# Patient Record
Sex: MALE | Race: OTHER | HISPANIC OR LATINO | Employment: FULL TIME | URBAN - METROPOLITAN AREA
[De-identification: names, ages, dates, MRNs, and addresses within clinical notes are randomized per-mention and may not be internally consistent; named-entity substitution may affect disease eponyms.]

---

## 2023-06-28 LAB
ALBUMIN SERPL BCP-MCNC: 6 G/DL (ref 3.5–5.2)
ALP SERPL-CCNC: 104 U/L (ref 55–135)
ALT SERPL W/O P-5'-P-CCNC: 26 U/L (ref 10–44)
ANION GAP SERPL CALC-SCNC: 26 MMOL/L (ref 8–16)
AST SERPL-CCNC: 30 U/L (ref 10–40)
BASOPHILS # BLD AUTO: 0.02 K/UL (ref 0–0.2)
BASOPHILS NFR BLD: 0.1 % (ref 0–1.9)
BILIRUB SERPL-MCNC: 0.7 MG/DL (ref 0.1–1)
BUN SERPL-MCNC: 67 MG/DL (ref 6–20)
CALCIUM SERPL-MCNC: 11 MG/DL (ref 8.7–10.5)
CHLORIDE SERPL-SCNC: 84 MMOL/L (ref 95–110)
CK SERPL-CCNC: 641 U/L (ref 20–200)
CO2 SERPL-SCNC: 26 MMOL/L (ref 23–29)
CREAT SERPL-MCNC: 5 MG/DL (ref 0.5–1.4)
DIFFERENTIAL METHOD: ABNORMAL
EOSINOPHIL # BLD AUTO: 0 K/UL (ref 0–0.5)
EOSINOPHIL NFR BLD: 0 % (ref 0–8)
ERYTHROCYTE [DISTWIDTH] IN BLOOD BY AUTOMATED COUNT: 12.1 % (ref 11.5–14.5)
EST. GFR  (NO RACE VARIABLE): 15 ML/MIN/1.73 M^2
GLUCOSE SERPL-MCNC: 140 MG/DL (ref 70–110)
HCT VFR BLD AUTO: 49.1 % (ref 40–54)
HGB BLD-MCNC: 17.3 G/DL (ref 14–18)
IMM GRANULOCYTES # BLD AUTO: 0.05 K/UL (ref 0–0.04)
IMM GRANULOCYTES NFR BLD AUTO: 0.4 % (ref 0–0.5)
LYMPHOCYTES # BLD AUTO: 1.4 K/UL (ref 1–4.8)
LYMPHOCYTES NFR BLD: 10.4 % (ref 18–48)
MCH RBC QN AUTO: 29.5 PG (ref 27–31)
MCHC RBC AUTO-ENTMCNC: 35.2 G/DL (ref 32–36)
MCV RBC AUTO: 84 FL (ref 82–98)
MONOCYTES # BLD AUTO: 1.1 K/UL (ref 0.3–1)
MONOCYTES NFR BLD: 8 % (ref 4–15)
NEUTROPHILS # BLD AUTO: 10.9 K/UL (ref 1.8–7.7)
NEUTROPHILS NFR BLD: 81.1 % (ref 38–73)
NRBC BLD-RTO: 0 /100 WBC
PLATELET # BLD AUTO: 346 K/UL (ref 150–450)
PMV BLD AUTO: 10.2 FL (ref 9.2–12.9)
POTASSIUM SERPL-SCNC: 5.3 MMOL/L (ref 3.5–5.1)
PROT SERPL-MCNC: 10.5 G/DL (ref 6–8.4)
RBC # BLD AUTO: 5.87 M/UL (ref 4.6–6.2)
SODIUM SERPL-SCNC: 136 MMOL/L (ref 136–145)
WBC # BLD AUTO: 13.44 K/UL (ref 3.9–12.7)

## 2023-06-28 PROCEDURE — 96360 HYDRATION IV INFUSION INIT: CPT

## 2023-06-28 PROCEDURE — 93005 ELECTROCARDIOGRAM TRACING: CPT

## 2023-06-28 PROCEDURE — 93010 EKG 12-LEAD: ICD-10-PCS | Mod: ,,, | Performed by: INTERNAL MEDICINE

## 2023-06-28 PROCEDURE — 82550 ASSAY OF CK (CPK): CPT | Performed by: NURSE PRACTITIONER

## 2023-06-28 PROCEDURE — 85025 COMPLETE CBC W/AUTO DIFF WBC: CPT | Performed by: NURSE PRACTITIONER

## 2023-06-28 PROCEDURE — 80053 COMPREHEN METABOLIC PANEL: CPT | Performed by: NURSE PRACTITIONER

## 2023-06-28 PROCEDURE — 99284 EMERGENCY DEPT VISIT MOD MDM: CPT | Mod: 25

## 2023-06-28 PROCEDURE — 93010 ELECTROCARDIOGRAM REPORT: CPT | Mod: ,,, | Performed by: INTERNAL MEDICINE

## 2023-06-28 PROCEDURE — 25000003 PHARM REV CODE 250: Performed by: NURSE PRACTITIONER

## 2023-06-28 RX ORDER — IBUPROFEN 800 MG/1
800 TABLET ORAL
Status: DISCONTINUED | OUTPATIENT
Start: 2023-06-28 | End: 2023-06-28

## 2023-06-28 RX ORDER — ACETAMINOPHEN 500 MG
1000 TABLET ORAL
Status: COMPLETED | OUTPATIENT
Start: 2023-06-28 | End: 2023-06-29

## 2023-06-28 RX ADMIN — SODIUM CHLORIDE 1000 ML: 9 INJECTION, SOLUTION INTRAVENOUS at 10:06

## 2023-06-29 ENCOUNTER — HOSPITAL ENCOUNTER (OUTPATIENT)
Facility: HOSPITAL | Age: 33
Discharge: HOME | End: 2023-06-29
Attending: EMERGENCY MEDICINE | Admitting: INTERNAL MEDICINE

## 2023-06-29 VITALS
TEMPERATURE: 99 F | OXYGEN SATURATION: 99 % | DIASTOLIC BLOOD PRESSURE: 70 MMHG | HEART RATE: 60 BPM | SYSTOLIC BLOOD PRESSURE: 100 MMHG | WEIGHT: 112.88 LBS | RESPIRATION RATE: 20 BRPM

## 2023-06-29 DIAGNOSIS — T67.5XXA HEAT EXHAUSTION, INITIAL ENCOUNTER: ICD-10-CM

## 2023-06-29 DIAGNOSIS — N17.9 ACUTE RENAL FAILURE, UNSPECIFIED ACUTE RENAL FAILURE TYPE: Primary | ICD-10-CM

## 2023-06-29 DIAGNOSIS — E86.0 DEHYDRATION: ICD-10-CM

## 2023-06-29 DIAGNOSIS — N17.9 AKI (ACUTE KIDNEY INJURY): ICD-10-CM

## 2023-06-29 DIAGNOSIS — R55 SYNCOPE: ICD-10-CM

## 2023-06-29 PROBLEM — E83.52 HYPERCALCEMIA: Status: ACTIVE | Noted: 2023-06-29

## 2023-06-29 LAB
ALBUMIN SERPL BCP-MCNC: 4.1 G/DL (ref 3.5–5.2)
ANION GAP SERPL CALC-SCNC: 10 MMOL/L (ref 8–16)
BACTERIA #/AREA URNS HPF: ABNORMAL /HPF
BILIRUB UR QL STRIP: NEGATIVE
BUN SERPL-MCNC: 38 MG/DL (ref 6–20)
CALCIUM SERPL-MCNC: 8.9 MG/DL (ref 8.7–10.5)
CAOX CRY URNS QL MICRO: ABNORMAL
CHLORIDE SERPL-SCNC: 102 MMOL/L (ref 95–110)
CLARITY UR: ABNORMAL
CO2 SERPL-SCNC: 25 MMOL/L (ref 23–29)
COLOR UR: YELLOW
CREAT SERPL-MCNC: 1.2 MG/DL (ref 0.5–1.4)
EST. GFR  (NO RACE VARIABLE): >60 ML/MIN/1.73 M^2
GLUCOSE SERPL-MCNC: 103 MG/DL (ref 70–110)
GLUCOSE UR QL STRIP: NEGATIVE
HGB UR QL STRIP: ABNORMAL
HYALINE CASTS #/AREA URNS LPF: 76 /LPF
KETONES UR QL STRIP: NEGATIVE
LEUKOCYTE ESTERASE UR QL STRIP: NEGATIVE
MICROSCOPIC COMMENT: ABNORMAL
NITRITE UR QL STRIP: NEGATIVE
PH UR STRIP: 6 [PH] (ref 5–8)
PHOSPHATE SERPL-MCNC: 3.5 MG/DL (ref 2.7–4.5)
POTASSIUM SERPL-SCNC: 3.9 MMOL/L (ref 3.5–5.1)
PROT UR QL STRIP: ABNORMAL
RBC #/AREA URNS HPF: 0 /HPF (ref 0–4)
SODIUM SERPL-SCNC: 137 MMOL/L (ref 136–145)
SP GR UR STRIP: 1.02 (ref 1–1.03)
URN SPEC COLLECT METH UR: ABNORMAL
UROBILINOGEN UR STRIP-ACNC: NEGATIVE EU/DL
WBC #/AREA URNS HPF: 3 /HPF (ref 0–5)

## 2023-06-29 PROCEDURE — 25000003 PHARM REV CODE 250: Performed by: INTERNAL MEDICINE

## 2023-06-29 PROCEDURE — 93005 ELECTROCARDIOGRAM TRACING: CPT

## 2023-06-29 PROCEDURE — 25000003 PHARM REV CODE 250: Performed by: EMERGENCY MEDICINE

## 2023-06-29 PROCEDURE — 96361 HYDRATE IV INFUSION ADD-ON: CPT

## 2023-06-29 PROCEDURE — 63600175 PHARM REV CODE 636 W HCPCS: Performed by: EMERGENCY MEDICINE

## 2023-06-29 PROCEDURE — G0378 HOSPITAL OBSERVATION PER HR: HCPCS

## 2023-06-29 PROCEDURE — 81000 URINALYSIS NONAUTO W/SCOPE: CPT | Performed by: EMERGENCY MEDICINE

## 2023-06-29 PROCEDURE — 93010 ELECTROCARDIOGRAM REPORT: CPT | Mod: ,,, | Performed by: INTERNAL MEDICINE

## 2023-06-29 PROCEDURE — 93010 EKG 12-LEAD: ICD-10-PCS | Mod: ,,, | Performed by: INTERNAL MEDICINE

## 2023-06-29 PROCEDURE — 25000003 PHARM REV CODE 250: Performed by: NURSE PRACTITIONER

## 2023-06-29 PROCEDURE — 80069 RENAL FUNCTION PANEL: CPT | Performed by: INTERNAL MEDICINE

## 2023-06-29 PROCEDURE — 36415 COLL VENOUS BLD VENIPUNCTURE: CPT | Performed by: INTERNAL MEDICINE

## 2023-06-29 RX ORDER — SODIUM CHLORIDE 9 MG/ML
INJECTION, SOLUTION INTRAVENOUS CONTINUOUS
Status: DISCONTINUED | OUTPATIENT
Start: 2023-06-29 | End: 2023-06-29 | Stop reason: HOSPADM

## 2023-06-29 RX ORDER — ACETAMINOPHEN 325 MG/1
650 TABLET ORAL EVERY 6 HOURS PRN
Status: DISCONTINUED | OUTPATIENT
Start: 2023-06-29 | End: 2023-06-29 | Stop reason: HOSPADM

## 2023-06-29 RX ORDER — GUAIFENESIN 100 MG/5ML
200 SOLUTION ORAL EVERY 4 HOURS PRN
Status: DISCONTINUED | OUTPATIENT
Start: 2023-06-29 | End: 2023-06-29 | Stop reason: HOSPADM

## 2023-06-29 RX ORDER — IPRATROPIUM BROMIDE AND ALBUTEROL SULFATE 2.5; .5 MG/3ML; MG/3ML
3 SOLUTION RESPIRATORY (INHALATION) EVERY 4 HOURS PRN
Status: DISCONTINUED | OUTPATIENT
Start: 2023-06-29 | End: 2023-06-29 | Stop reason: HOSPADM

## 2023-06-29 RX ORDER — ONDANSETRON 2 MG/ML
4 INJECTION INTRAMUSCULAR; INTRAVENOUS EVERY 8 HOURS PRN
Status: DISCONTINUED | OUTPATIENT
Start: 2023-06-29 | End: 2023-06-29 | Stop reason: HOSPADM

## 2023-06-29 RX ADMIN — SODIUM CHLORIDE: 9 INJECTION, SOLUTION INTRAVENOUS at 06:06

## 2023-06-29 RX ADMIN — SODIUM CHLORIDE, POTASSIUM CHLORIDE, SODIUM LACTATE AND CALCIUM CHLORIDE 1000 ML: 600; 310; 30; 20 INJECTION, SOLUTION INTRAVENOUS at 02:06

## 2023-06-29 RX ADMIN — ACETAMINOPHEN 1000 MG: 500 TABLET ORAL at 02:06

## 2023-06-29 RX ADMIN — SODIUM CHLORIDE 1000 ML: 9 INJECTION, SOLUTION INTRAVENOUS at 02:06

## 2023-06-29 NOTE — DISCHARGE SUMMARY
[facility] - Emergency Dept.  Hospital Medicine  Discharge Summary      Patient Name: Josue Cornelius  MRN: [id number]  [facility]: [id number]  Patient Class: OP- Observation  Admission Date: 6/29/2023  Hospital Length of Stay: 0 days  Discharge Date and Time:  06/29/2023 11:56 AM  Attending Physician: Juana Bernard MD   Discharging Provider: Juana Bernard MD  Primary Care Provider: No primary care provider on file.    Primary Care Team: Networked reference to record PCT     HPI:   Mr. Riggs is a 33-year-old  male with no significant medical problems, was brought to the ED after he passed out at work working in the hot sun.  He was complaining of muscle cramps, generalized myalgias, generalized weakness.  In the ED was found to have creatinine 5.0, BUN 67, potassium 5.3, calcium 11.0.  CPK elevated at 641.  No prior known history of kidney problems.  Received 2 L NS boluses, and one LR bolus in the ED. placed in observation for LYNSEY due to heat exhaustion.      * No surgery found *      Hospital Course:   The patient presented to the ED after passing out at work. He had signs and symptoms of heat exhaustion. He was placed on IVFs. Workup revealed LYNSEY. Creatinine initially 5 but improved to 1.2 with fluids. He was able to tolerate oral intake and all symptoms resolved. He was instructed to increase his fluid intake while working outside in heat and to take breaks. He verbalized understanding. Rest today and resume normal activities tomorrow. Patient seen and examined, stable for discharge.       Goals of Care Treatment Preferences:  Code Status: Full Code      Consults:       Final Active Diagnoses:    Diagnosis Date Noted POA    PRINCIPAL PROBLEM:  LYNSEY (acute kidney injury) [N17.9] 06/29/2023 Yes    Hypercalcemia [E83.52] 06/29/2023 Yes      Problems Resolved During this Admission:       Discharged Condition: stable    Disposition: Home or Self Care    Follow Up:    Patient Instructions:      Diet  Adult Regular     Follow-up primary physician   Standing Status: Future Standing Exp. Date: 06/28/24     Activity as tolerated       Significant Diagnostic Studies: Labs:   BMP:   Recent Labs   Lab 06/28/23  2220 06/29/23  0956   * 103    137   K 5.3* 3.9   CL 84* 102   CO2 26 25   BUN 67* 38*   CREATININE 5.0* 1.2   CALCIUM 11.0* 8.9       Pending Diagnostic Studies:     None         Medications:  None    Indwelling Lines/Drains at time of discharge:   Lines/Drains/Airways     None                 Time spent on the discharge of patient: 31 minutes         Juana Bernard MD  Department of Hospital Medicine  [facility] - Emergency Dept.

## 2023-06-29 NOTE — H&P
[facility] - Emergency Dept.  [facility]  History & Physical    Patient Name: Josue Cornelius  MRN: [id number]  Patient Class: OP- Observation  Admission Date: 6/29/2023  Attending Physician: Koko Read MD   Primary Care Provider: No primary care provider on file.         Patient information was obtained from patient, past medical records and ER records.     Subjective:     Principal Problem:LYNSEY (acute kidney injury)    Chief Complaint:   Chief Complaint   Patient presents with    Heat Exposure     Pt states he fainted today while at work due to heat exhaustion. Pt states he has a slight headache and has muscle cramps in his legs. No other complaints at this time.         HPI: Mr. Riggs is a 33-year-old  male with no significant medical problems, was brought to the ED after he passed out at work working in the hot sun.  He was complaining of muscle cramps, generalized myalgias, generalized weakness.  In the ED was found to have creatinine 5.0, BUN 67, potassium 5.3, calcium 11.0.  CPK elevated at 641.  No prior known history of kidney problems.  Received 2 L NS boluses, and one LR bolus in the ED. placed in observation for LYNSEY due to heat exhaustion.      History reviewed. No pertinent past medical history.    History reviewed. No pertinent surgical history.    Review of patient's allergies indicates:  No Known Allergies    No current facility-administered medications on file prior to encounter.     No current outpatient medications on file prior to encounter.     Family History    Reviewed and Not Pertinent       Tobacco Use    Smoking status: Never    Smokeless tobacco: Never   Substance and Sexual Activity    Alcohol use: Not on file    Drug use: Not on file    Sexual activity: Not on file     Review of Systems   Constitutional: Negative.  Negative for chills and fever.   HENT: Negative.  Negative for congestion, rhinorrhea, sore throat and trouble swallowing.    Eyes: Negative.   Negative for visual disturbance.   Respiratory: Negative.  Negative for cough, shortness of breath and wheezing.    Cardiovascular: Negative.  Negative for chest pain and palpitations.   Gastrointestinal: Negative.  Negative for abdominal pain, diarrhea, nausea and vomiting.   Endocrine: Negative.    Genitourinary: Negative.  Negative for dysuria and flank pain.   Musculoskeletal:  Positive for myalgias. Negative for back pain.   Skin: Negative.  Negative for rash.   Allergic/Immunologic: Negative.    Neurological:  Positive for weakness. Negative for speech difficulty, numbness and headaches.   Hematological: Negative.    Psychiatric/Behavioral: Negative.  Negative for hallucinations.    All other systems reviewed and are negative.  Objective:     Vital Signs (Most Recent):  Temp: 98.5 °F (36.9 °C) (06/29/23 0317)  Pulse: 67 (06/29/23 0403)  Resp: 20 (06/29/23 0403)  BP: 105/65 (06/29/23 0402)  SpO2: 98 % (06/29/23 0403) Vital Signs (24h Range):  Temp:  [98.2 °F (36.8 °C)-98.5 °F (36.9 °C)] 98.5 °F (36.9 °C)  Pulse:  [64-77] 67  Resp:  [13-23] 20  SpO2:  [97 %-100 %] 98 %  BP: (105-123)/(65-82) 105/65     Weight: 51.2 kg (112 lb 14 oz)  There is no height or weight on file to calculate BMI.     Physical Exam  Vitals and nursing note reviewed.   Constitutional:       General: He is awake. He is not in acute distress.     Appearance: He is not ill-appearing.   HENT:      Head: Normocephalic and atraumatic.      Mouth/Throat:      Mouth: Mucous membranes are moist.   Eyes:      General: No scleral icterus.     Conjunctiva/sclera: Conjunctivae normal.   Cardiovascular:      Rate and Rhythm: Normal rate and regular rhythm.      Heart sounds: No murmur heard.  Pulmonary:      Effort: Pulmonary effort is normal. No respiratory distress.      Breath sounds: Normal breath sounds. No wheezing.   Abdominal:      Palpations: Abdomen is soft.      Tenderness: There is no abdominal tenderness.   Musculoskeletal:         General:  No swelling. Normal range of motion.      Cervical back: Normal range of motion and neck supple.   Skin:     General: Skin is warm.      Coloration: Skin is not jaundiced.   Neurological:      General: No focal deficit present.      Mental Status: He is alert and oriented to person, place, and time. Mental status is at baseline.   Psychiatric:         Attention and Perception: Attention normal.         Mood and Affect: Mood normal.         Speech: Speech normal.         Behavior: Behavior normal. Behavior is cooperative.              Significant Labs: All pertinent labs within the past 24 hours have been reviewed.  CBC:   Recent Labs   Lab 06/28/23  2220   WBC 13.44*   HGB 17.3   HCT 49.1        CMP:   Recent Labs   Lab 06/28/23  2220      K 5.3*   CL 84*   CO2 26   *   BUN 67*   CREATININE 5.0*   CALCIUM 11.0*   PROT 10.5*   ALBUMIN 6.0*   BILITOT 0.7   ALKPHOS 104   AST 30   ALT 26   ANIONGAP 26*       Significant Imaging: I have reviewed all pertinent imaging results/findings within the past 24 hours.  I have reviewed and interpreted all pertinent imaging results/findings within the past 24 hours.    Imaging Results    None         I have independently reviewed all pertinent labs within the past 24 hours.    I have independently reviewed, visualized and interpreted all pertinent imaging results within the past 24 hours and discussed the findings with the ED physician, Dr. Ordonez          Assessment/Plan:     * LYNSEY (acute kidney injury)  Patient with acute kidney injury/acute renal failure likely due to pre-renal azotemia due to IVVD LYNSEY is currently worsening. Baseline creatinine unknown - Labs reviewed- Renal function/electrolytes with CrCl cannot be calculated (Unknown ideal weight.). according to latest data. Monitor urine output and serial BMP and adjust therapy as needed. Avoid nephrotoxins and renally dose meds for GFR listed above.    -LYNSEY, creatinine 5.0, secondary to heat exhaustion.     -received three L boluses in the ED.    -continue NS at 125 cc/hour.    -repeat labs in a.m..    -check CPK.    Hypercalcemia  -calcium elevated 11.0, secondary to volume depletion.    -continue IV fluids.    -repeat labs in a.m..        VTE Risk Mitigation (From admission, onward)         Ordered     Place sequential compression device  Until discontinued         06/29/23 0534                   On 06/29/2023, patient should be placed in hospital observation services under my care.        Koko Read MD  Department of Hospital Medicine  [facility] - Emergency Dept.

## 2023-06-29 NOTE — ED PROVIDER NOTES
SCRIBE #1 NOTE: I, Flakita Khan, am scribing for, and in the presence of, Gerry Ordonez MD. I have scribed the entire note.       History     Chief Complaint   Patient presents with    Heat Exposure     Pt states he fainted today while at work due to heat exhaustion. Pt states he has a slight headache and has muscle cramps in his legs. No other complaints at this time.      HPI  6/29/2023, 12:32 AM  History obtained from the patient via [place] tele-    HPI:  Josue Cornelius is a 33 y.o. male who presents to the [facility] emergency department for evaluation following 1 episode of syncope due to heat exposure which onset PTA. Pt was at work when the episode occurred. Associated symptoms include fatigue, headache, generalized myalgias, and nausea. Exacerbating factors: none reported. No prior treatment reported. Pt reports that his sxs are improving slightly. No other complaints or concerns.     Arrival mode: Personal vehicle      PCP: No primary care provider on file.    Review of patient's allergies indicates:  No Known Allergies   History reviewed. No pertinent past medical history.  History reviewed. No pertinent surgical history.    History reviewed. No pertinent family history.  Social History     Tobacco Use    Smoking status: Never    Smokeless tobacco: Never   Substance and Sexual Activity    Alcohol use: Not on file    Drug use: Not on file    Sexual activity: Not on file      Review of Systems     Review of Systems   Constitutional:  Positive for fatigue.   HENT: Negative.     Eyes: Negative.    Respiratory: Negative.     Cardiovascular: Negative.    Gastrointestinal:  Positive for nausea.   Endocrine: Negative.    Genitourinary: Negative.    Musculoskeletal:  Positive for myalgias (generalized).   Skin: Negative.    Allergic/Immunologic: Negative.    Neurological:  Positive for syncope (1 episode) and headaches.   Hematological: Negative.    Psychiatric/Behavioral: Negative.      All other systems reviewed and are negative.     Physical Exam     Initial Vitals [06/28/23 2203]   BP Pulse Resp Temp SpO2   118/71 77 20 98.2 °F (36.8 °C) 100 %      MAP       --          Physical Exam  Nursing notes and vital signs reviewed.  Constitutional: Patient is in no distress. Fatigued and ill-appearing. Very low voice volume.   Head: Normocephalic. Atraumatic.   Eyes: Conjunctivae are not pale. No scleral icterus.   ENT: Mucous membranes dry.   Neck: Supple.   Cardiovascular: Regular rate. Regular rhythm.   Pulmonary: No respiratory distress.   Abdominal: Non-distended.   Musculoskeletal: Moves all extremities. No obvious deformities. No edema.   Skin: Warm and very dry.   Neurological:  Alert, awake, and appropriate. Normal speech. No acute lateralizing neurologic deficits appreciated.   Psychiatric: Normal affect.       ED Course   Critical Care    Date/Time: 6/29/2023 12:36 AM  Performed by: Gerry Ordonez MD  Authorized by: Gerry Ordonez MD   Direct patient critical care time: 16 minutes  Additional history critical care time: 6 minutes  Ordering / reviewing critical care time: 5 minutes  Documentation critical care time: 4 minutes  Consulting other physicians critical care time: 4 minutes  Total critical care time (exclusive of procedural time) : 35 minutes  Critical care time was exclusive of separately billable procedures and treating other patients and teaching time.  Critical care was necessary to treat or prevent imminent or life-threatening deterioration of the following conditions: renal failure and dehydration.  Critical care was time spent personally by me on the following activities: blood draw for specimens, development of treatment plan with patient or surrogate, discussions with consultants, interpretation of cardiac output measurements, evaluation of patient's response to treatment, examination of patient, obtaining history from patient or surrogate, ordering and  performing treatments and interventions, ordering and review of laboratory studies, ordering and review of radiographic studies, pulse oximetry, re-evaluation of patient's condition and review of old charts.      Vitals:    06/29/23 0247 06/29/23 0259 06/29/23 0302 06/29/23 0317   BP: 117/80  119/72 107/68   Pulse: 67 67 68 69   Resp:   (!) 23    Temp:    98.5 °F (36.9 °C)   TempSrc:    Oral   SpO2: 99%  99% 97%   Weight:        06/29/23 0322 06/29/23 0332 06/29/23 0347 06/29/23 0350   BP:  112/68 110/71    Pulse: 66 74 72 73   Resp: 13   19   Temp:       TempSrc:       SpO2: 100% 97% 98% 99%   Weight:        06/29/23 0356 06/29/23 0402 06/29/23 0403 06/29/23 0730   BP:  105/65  (!) 101/56   Pulse: 65 65 67 64   Resp: 17  20 12   Temp:       TempSrc:       SpO2: 97% 97% 98% 98%   Weight:        06/29/23 1100 06/29/23 1201 06/29/23 1202   BP: 99/65 100/70    Pulse: (!) 55  60   Resp: 20     Temp:      TempSrc:      SpO2: 99% 99%    Weight:        Lab Results Interpreted as Abnormal:  Labs Reviewed   CBC W/ AUTO DIFFERENTIAL - Abnormal; Notable for the following components:       Result Value    WBC 13.44 (*)     Gran # (ANC) 10.9 (*)     Immature Grans (Abs) 0.05 (*)     Mono # 1.1 (*)     Gran % 81.1 (*)     Lymph % 10.4 (*)     All other components within normal limits   COMPREHENSIVE METABOLIC PANEL - Abnormal; Notable for the following components:    Potassium 5.3 (*)     Chloride 84 (*)     Glucose 140 (*)     BUN 67 (*)     Creatinine 5.0 (*)     Calcium 11.0 (*)     Total Protein 10.5 (*)     Albumin 6.0 (*)     eGFR 15 (*)     Anion Gap 26 (*)     All other components within normal limits   CK - Abnormal; Notable for the following components:     (*)     All other components within normal limits   URINALYSIS, REFLEX TO URINE CULTURE - Abnormal; Notable for the following components:    Appearance, UA Hazy (*)     Protein, UA 1+ (*)     Occult Blood UA 1+ (*)     All other components within normal limits     Narrative:     Specimen Source->Urine   URINALYSIS MICROSCOPIC - Abnormal; Notable for the following components:    Hyaline Casts, UA 76 (*)     All other components within normal limits    Narrative:     Specimen Source->Urine   RENAL FUNCTION PANEL - Abnormal; Notable for the following components:    BUN 38 (*)     All other components within normal limits      All Lab Results:  Results for orders placed or performed during the hospital encounter of 06/29/23   CBC auto differential   Result Value Ref Range    WBC 13.44 (H) 3.90 - 12.70 K/uL    RBC 5.87 4.60 - 6.20 M/uL    Hemoglobin 17.3 14.0 - 18.0 g/dL    Hematocrit 49.1 40.0 - 54.0 %    MCV 84 82 - 98 fL    MCH 29.5 27.0 - 31.0 pg    MCHC 35.2 32.0 - 36.0 g/dL    RDW 12.1 11.5 - 14.5 %    Platelets 346 150 - 450 K/uL    MPV 10.2 9.2 - 12.9 fL    Immature Granulocytes 0.4 0.0 - 0.5 %    Gran # (ANC) 10.9 (H) 1.8 - 7.7 K/uL    Immature Grans (Abs) 0.05 (H) 0.00 - 0.04 K/uL    Lymph # 1.4 1.0 - 4.8 K/uL    Mono # 1.1 (H) 0.3 - 1.0 K/uL    Eos # 0.0 0.0 - 0.5 K/uL    Baso # 0.02 0.00 - 0.20 K/uL    nRBC 0 0 /100 WBC    Gran % 81.1 (H) 38.0 - 73.0 %    Lymph % 10.4 (L) 18.0 - 48.0 %    Mono % 8.0 4.0 - 15.0 %    Eosinophil % 0.0 0.0 - 8.0 %    Basophil % 0.1 0.0 - 1.9 %    Differential Method Automated    Comprehensive metabolic panel   Result Value Ref Range    Sodium 136 136 - 145 mmol/L    Potassium 5.3 (H) 3.5 - 5.1 mmol/L    Chloride 84 (L) 95 - 110 mmol/L    CO2 26 23 - 29 mmol/L    Glucose 140 (H) 70 - 110 mg/dL    BUN 67 (H) 6 - 20 mg/dL    Creatinine 5.0 (H) 0.5 - 1.4 mg/dL    Calcium 11.0 (H) 8.7 - 10.5 mg/dL    Total Protein 10.5 (H) 6.0 - 8.4 g/dL    Albumin 6.0 (H) 3.5 - 5.2 g/dL    Total Bilirubin 0.7 0.1 - 1.0 mg/dL    Alkaline Phosphatase 104 55 - 135 U/L    AST 30 10 - 40 U/L    ALT 26 10 - 44 U/L    eGFR 15 (A) >60 mL/min/1.73 m^2    Anion Gap 26 (H) 8 - 16 mmol/L   CPK   Result Value Ref Range     (H) 20 - 200 U/L   Urinalysis, Reflex to  Urine Culture Urine, Clean Catch    Specimen: Urine   Result Value Ref Range    Specimen UA Urine, Clean Catch     Color, UA Yellow Yellow, Straw, Josephine    Appearance, UA Hazy (A) Clear    pH, UA 6.0 5.0 - 8.0    Specific Gravity, UA 1.020 1.005 - 1.030    Protein, UA 1+ (A) Negative    Glucose, UA Negative Negative    Ketones, UA Negative Negative    Bilirubin (UA) Negative Negative    Occult Blood UA 1+ (A) Negative    Nitrite, UA Negative Negative    Urobilinogen, UA Negative <2.0 EU/dL    Leukocytes, UA Negative Negative   Urinalysis Microscopic   Result Value Ref Range    RBC, UA 0 0 - 4 /hpf    WBC, UA 3 0 - 5 /hpf    Bacteria Rare None-Occ /hpf    Hyaline Casts, UA 76 (A) 0-1/lpf /lpf    Ca Oxalate Fatou, UA Rare None-Moderate    Microscopic Comment SEE COMMENT    Renal function panel   Result Value Ref Range    Glucose 103 70 - 110 mg/dL    Sodium 137 136 - 145 mmol/L    Potassium 3.9 3.5 - 5.1 mmol/L    Chloride 102 95 - 110 mmol/L    CO2 25 23 - 29 mmol/L    BUN 38 (H) 6 - 20 mg/dL    Calcium 8.9 8.7 - 10.5 mg/dL    Creatinine 1.2 0.5 - 1.4 mg/dL    Albumin 4.1 3.5 - 5.2 g/dL    Phosphorus 3.5 2.7 - 4.5 mg/dL    eGFR >60 >60 mL/min/1.73 m^2    Anion Gap 10 8 - 16 mmol/L     Imaging Results    None          The EKG was ordered, reviewed, and independently interpreted by the ED Physician:  Interpretation time: 02:14  Rate: 67 bpm  Rhythm: normal sinus rhythm  Interpretation: No acute ST changes. No STEMI.      The emergency physician reviewed the vital signs and test results, which are outlined above.     ED Discussion     2:54 AM: Discussed case with Aurora Sinclair NP ([facility]). Dr. Read agrees with current care and management of pt and accepts admission.   Admitting Service: Hospital Medicine  Admitting Physician: Dr. Read  Admit to: Obs med/tele    2:54 AM: Re-evaluated pt. I have discussed test results, shared treatment plan, and the need for admission with patient and family at bedside.  Pt and family express understanding at this time and agree with all information. All questions answered. Pt and family have no further questions or concerns at this time. Pt is ready for admit.         Medical Decision Making:   Clinical Tests:   Lab Tests: Ordered and Reviewed  Medical Tests: Ordered and Reviewed       ED Medication(s) Administered:  Medications   sodium chloride 0.9% bolus 1,000 mL 1,000 mL (0 mLs Intravenous Stopped 6/28/23 2300)   sodium chloride 0.9% bolus 1,000 mL 1,000 mL (0 mLs Intravenous Stopped 6/29/23 0257)   lactated ringers bolus 1,000 mL (0 mLs Intravenous Stopped 6/29/23 0403)   acetaminophen tablet 1,000 mg (1,000 mg Oral Given 6/29/23 0220)     Prescription Management: I performed a review of the patient's current Rx medication list as input by nursing staff.  There are no discharge medications for this patient.             Scribe Attestation:   Scribe #1: I performed the above scribed service and the documentation accurately describes the services I performed. I attest to the accuracy of the note.     Attending:   Physician Attestation Statement for Scribe #1: I, Gerry Ordonez MD, personally performed the services described in this documentation, as scribed by Flakita Khan, in my presence, and it is both accurate and complete. As with other dictation methods such as dictation software, small errors or inconsistencies may be overlooked due to the goal of spending more face-to-face time with patients.      Clinical Impression       ICD-10-CM ICD-9-CM   1. Acute renal failure, unspecified acute renal failure type  N17.9 584.9   2. Syncope  R55 780.2   3. Dehydration  E86.0 276.51   4. Heat exhaustion, initial encounter  T67.5XXA 992.5   5. LYNSEY (acute kidney injury)  N17.9 584.9      ED Disposition Condition    Observation Serious               Gerry Ordonez MD  06/29/23 6423

## 2023-06-29 NOTE — ASSESSMENT & PLAN NOTE
Patient with acute kidney injury/acute renal failure likely due to pre-renal azotemia due to IVVD LYNSEY is currently worsening. Baseline creatinine unknown - Labs reviewed- Renal function/electrolytes with CrCl cannot be calculated (Unknown ideal weight.). according to latest data. Monitor urine output and serial BMP and adjust therapy as needed. Avoid nephrotoxins and renally dose meds for GFR listed above.    -LYNSEY, creatinine 5.0, secondary to heat exhaustion.    -received three L boluses in the ED.    -continue NS at 125 cc/hour.    -repeat labs in a.m..    -check CPK.

## 2023-06-29 NOTE — Clinical Note
Diagnosis: Acute renal failure, unspecified acute renal failure type [[id number]]   Future Attending Provider: PETRA GOSS [[id number]]   Admitting Provider:: PETRA GOSS [[id number]]   Special Needs:: No Special Needs [1]

## 2023-06-29 NOTE — ASSESSMENT & PLAN NOTE
-calcium elevated 11.0, secondary to volume depletion.    -continue IV fluids.    -repeat labs in a.m..

## 2023-06-29 NOTE — SUBJECTIVE & OBJECTIVE
History reviewed. No pertinent past medical history.    History reviewed. No pertinent surgical history.    Review of patient's allergies indicates:  No Known Allergies    No current facility-administered medications on file prior to encounter.     No current outpatient medications on file prior to encounter.     Family History    Reviewed and Not Pertinent       Tobacco Use    Smoking status: Never    Smokeless tobacco: Never   Substance and Sexual Activity    Alcohol use: Not on file    Drug use: Not on file    Sexual activity: Not on file     Review of Systems   Constitutional: Negative.  Negative for chills and fever.   HENT: Negative.  Negative for congestion, rhinorrhea, sore throat and trouble swallowing.    Eyes: Negative.  Negative for visual disturbance.   Respiratory: Negative.  Negative for cough, shortness of breath and wheezing.    Cardiovascular: Negative.  Negative for chest pain and palpitations.   Gastrointestinal: Negative.  Negative for abdominal pain, diarrhea, nausea and vomiting.   Endocrine: Negative.    Genitourinary: Negative.  Negative for dysuria and flank pain.   Musculoskeletal:  Positive for myalgias. Negative for back pain.   Skin: Negative.  Negative for rash.   Allergic/Immunologic: Negative.    Neurological:  Positive for weakness. Negative for speech difficulty, numbness and headaches.   Hematological: Negative.    Psychiatric/Behavioral: Negative.  Negative for hallucinations.    All other systems reviewed and are negative.  Objective:     Vital Signs (Most Recent):  Temp: 98.5 °F (36.9 °C) (06/29/23 0317)  Pulse: 67 (06/29/23 0403)  Resp: 20 (06/29/23 0403)  BP: 105/65 (06/29/23 0402)  SpO2: 98 % (06/29/23 0403) Vital Signs (24h Range):  Temp:  [98.2 °F (36.8 °C)-98.5 °F (36.9 °C)] 98.5 °F (36.9 °C)  Pulse:  [64-77] 67  Resp:  [13-23] 20  SpO2:  [97 %-100 %] 98 %  BP: (105-123)/(65-82) 105/65     Weight: 51.2 kg (112 lb 14 oz)  There is no height or weight on file to calculate  BMI.     Physical Exam  Vitals and nursing note reviewed.   Constitutional:       General: He is awake. He is not in acute distress.     Appearance: He is not ill-appearing.   HENT:      Head: Normocephalic and atraumatic.      Mouth/Throat:      Mouth: Mucous membranes are moist.   Eyes:      General: No scleral icterus.     Conjunctiva/sclera: Conjunctivae normal.   Cardiovascular:      Rate and Rhythm: Normal rate and regular rhythm.      Heart sounds: No murmur heard.  Pulmonary:      Effort: Pulmonary effort is normal. No respiratory distress.      Breath sounds: Normal breath sounds. No wheezing.   Abdominal:      Palpations: Abdomen is soft.      Tenderness: There is no abdominal tenderness.   Musculoskeletal:         General: No swelling. Normal range of motion.      Cervical back: Normal range of motion and neck supple.   Skin:     General: Skin is warm.      Coloration: Skin is not jaundiced.   Neurological:      General: No focal deficit present.      Mental Status: He is alert and oriented to person, place, and time. Mental status is at baseline.   Psychiatric:         Attention and Perception: Attention normal.         Mood and Affect: Mood normal.         Speech: Speech normal.         Behavior: Behavior normal. Behavior is cooperative.              Significant Labs: All pertinent labs within the past 24 hours have been reviewed.  CBC:   Recent Labs   Lab 06/28/23  2220   WBC 13.44*   HGB 17.3   HCT 49.1        CMP:   Recent Labs   Lab 06/28/23  2220      K 5.3*   CL 84*   CO2 26   *   BUN 67*   CREATININE 5.0*   CALCIUM 11.0*   PROT 10.5*   ALBUMIN 6.0*   BILITOT 0.7   ALKPHOS 104   AST 30   ALT 26   ANIONGAP 26*       Significant Imaging: I have reviewed all pertinent imaging results/findings within the past 24 hours.  I have reviewed and interpreted all pertinent imaging results/findings within the past 24 hours.    Imaging Results    None         I have independently reviewed  all pertinent labs within the past 24 hours.    I have independently reviewed, visualized and interpreted all pertinent imaging results within the past 24 hours and discussed the findings with the ED physician, Dr. Ordonez

## 2023-06-29 NOTE — HOSPITAL COURSE
The patient presented to the ED after passing out at work. He had signs and symptoms of heat exhaustion. He was placed on IVFs. Workup revealed LYNSEY. Creatinine initially 5 but improved to 1.2 with fluids. He was able to tolerate oral intake and all symptoms resolved. He was instructed to increase his fluid intake while working outside in heat and to take breaks. He verbalized understanding. Rest today and resume normal activities tomorrow. Patient seen and examined, stable for discharge.

## 2023-06-29 NOTE — HPI
Mr. Riggs is a 33-year-old  male with no significant medical problems, was brought to the ED after he passed out at work working in the hot sun.  He was complaining of muscle cramps, generalized myalgias, generalized weakness.  In the ED was found to have creatinine 5.0, BUN 67, potassium 5.3, calcium 11.0.  CPK elevated at 641.  No prior known history of kidney problems.  Received 2 L NS boluses, and one LR bolus in the ED. placed in observation for LYNSEY due to heat exhaustion.

## 2023-10-02 PROBLEM — N17.9 AKI (ACUTE KIDNEY INJURY): Status: RESOLVED | Noted: 2023-06-29 | Resolved: 2023-10-02
